# Patient Record
(demographics unavailable — no encounter records)

---

## 2025-03-12 NOTE — ASSESSMENT
[FreeTextEntry1] : 62 year old male with PMH HTN, A fib /PE on eliquis, pre DM , hx of splenectomy and colon reconstruction per pt sent for anemia work up. Currently reports LLQ pain intermittently relives with BM.  #LLQ abdominal pain likely sec to hernia repair #Microcytic anemia- no overt GI bleed - Hb 1/24: 13.2, in 2023: 10.8 - MCV 73 - No abdominal imaging - reports hx of splenectomy and colon reconstruction after trauma - Colonoscopy 10 years ago in [place] - No family hx of Gi related malignancies/alarm symptoms  RECS - Recommend surgery follow up given hernia repair at [facility], CTAP ordered - Patient will need clearance from cardiology /pulm if ok to hold eliquis for EGD/Colonoscopy - Will order CBC, CMP, INR, iron studies - Will schedule for EGD and colonoscopy once ok to hold eliquis; need to hold for at least 2 days - Recommend high fiber diet, adequate hydration - RTC in 2months  #Elevated ALP - LFTs 24: 0.4/143/15/15 - prior AUD, former smoker  RECS: - Repeat CMp, GGT, RUQ US - RTC in 1-2 months

## 2025-03-12 NOTE — PHYSICAL EXAM
[Alert] : alert [Normal Voice/Communication] : normal voice/communication [Sclera] : the sclera and conjunctiva were normal [Hearing Threshold Finger Rub Not Mono] : hearing was normal [Normal Appearance] : the appearance of the neck was normal [No Respiratory Distress] : no respiratory distress [No Acc Muscle Use] : no accessory muscle use [Heart Rate And Rhythm] : heart rate was normal and rhythm regular [Normal S1, S2] : normal S1 and S2 [Bowel Sounds] : normal bowel sounds [Abdomen Tenderness] : non-tender [No Masses] : no abdominal mass palpated [Abdomen Soft] : soft [No Focal Deficits] : no focal deficits [Oriented To Time, Place, And Person] : oriented to person, place, and time

## 2025-03-12 NOTE — HISTORY OF PRESENT ILLNESS
[FreeTextEntry1] : 62 year old male with PMH HTN, A fib /PE on eliquis, pre DM , hx of splenectomy and colon reconstruction per pt sent for anemia work up. Currently reports LLQ pain intermittently relives with BM.  Patient denies any abdominal pain, nausea , vomiting, swallowing difficulty, unintentional weight loss, blood in stool. Denies any NSAID use    Has regular Bowel movements. Denies any diarrhea or constipation  Labs reviewed, hb:1/24: 13.2 (2023: 10.8)   LFTs: 0.4/143/15/15 No significant family history of GI related cancers former smoker, prior alcohol use